# Patient Record
Sex: MALE | Race: WHITE | NOT HISPANIC OR LATINO | URBAN - METROPOLITAN AREA
[De-identification: names, ages, dates, MRNs, and addresses within clinical notes are randomized per-mention and may not be internally consistent; named-entity substitution may affect disease eponyms.]

---

## 2018-04-23 ENCOUNTER — ON CAMPUS - OUTPATIENT (AMBULATORY)
Dept: URBAN - METROPOLITAN AREA HOSPITAL 2 | Facility: HOSPITAL | Age: 53
End: 2018-04-23
Payer: COMMERCIAL

## 2018-04-23 VITALS
DIASTOLIC BLOOD PRESSURE: 51 MMHG | HEART RATE: 71 BPM | SYSTOLIC BLOOD PRESSURE: 133 MMHG | RESPIRATION RATE: 16 BRPM | HEART RATE: 65 BPM | HEART RATE: 76 BPM | DIASTOLIC BLOOD PRESSURE: 94 MMHG | OXYGEN SATURATION: 99 % | HEART RATE: 82 BPM | SYSTOLIC BLOOD PRESSURE: 91 MMHG | SYSTOLIC BLOOD PRESSURE: 103 MMHG | TEMPERATURE: 97.5 F | OXYGEN SATURATION: 100 % | SYSTOLIC BLOOD PRESSURE: 104 MMHG | DIASTOLIC BLOOD PRESSURE: 60 MMHG | DIASTOLIC BLOOD PRESSURE: 70 MMHG | SYSTOLIC BLOOD PRESSURE: 106 MMHG | HEART RATE: 69 BPM | RESPIRATION RATE: 18 BRPM | DIASTOLIC BLOOD PRESSURE: 63 MMHG | HEIGHT: 66 IN | SYSTOLIC BLOOD PRESSURE: 118 MMHG | OXYGEN SATURATION: 97 % | OXYGEN SATURATION: 96 % | HEART RATE: 68 BPM | DIASTOLIC BLOOD PRESSURE: 85 MMHG | RESPIRATION RATE: 14 BRPM | SYSTOLIC BLOOD PRESSURE: 94 MMHG | OXYGEN SATURATION: 98 % | HEART RATE: 64 BPM | WEIGHT: 145 LBS | DIASTOLIC BLOOD PRESSURE: 54 MMHG | SYSTOLIC BLOOD PRESSURE: 116 MMHG

## 2018-04-23 DIAGNOSIS — Z12.11 ENCOUNTER FOR SCREENING FOR MALIGNANT NEOPLASM OF COLON: ICD-10-CM

## 2018-04-23 DIAGNOSIS — K64.1 SECOND DEGREE HEMORRHOIDS: ICD-10-CM

## 2018-04-23 PROCEDURE — 45378 DIAGNOSTIC COLONOSCOPY: CPT | Mod: 33 | Performed by: INTERNAL MEDICINE

## 2018-04-23 RX ADMIN — PROPOFOL: 10 INJECTION, EMULSION INTRAVENOUS at 10:43

## 2019-06-16 ENCOUNTER — HOSPITAL ENCOUNTER (EMERGENCY)
Facility: HOSPITAL | Age: 54
Discharge: HOME | End: 2019-06-16
Attending: EMERGENCY MEDICINE | Admitting: EMERGENCY MEDICINE

## 2019-06-16 VITALS
RESPIRATION RATE: 16 BRPM | SYSTOLIC BLOOD PRESSURE: 134 MMHG | BODY MASS INDEX: 23.46 KG/M2 | TEMPERATURE: 98.2 F | HEART RATE: 70 BPM | HEIGHT: 67 IN | OXYGEN SATURATION: 100 % | WEIGHT: 149.47 LBS | DIASTOLIC BLOOD PRESSURE: 78 MMHG

## 2019-06-16 DIAGNOSIS — R53.1 WEAKNESS: Primary | ICD-10-CM

## 2019-06-16 LAB
ALBUMIN SERPL-MCNC: 4.4 G/DL (ref 3.5–4.8)
ALBUMIN/GLOB SERPL: 1.6 G/DL (ref 1–1.7)
ALP SERPL-CCNC: 53 U/L (ref 32–91)
ALT SERPL W P-5'-P-CCNC: 19 U/L (ref 17–63)
ANION GAP SERPL CALCULATED.3IONS-SCNC: 13 MMOL/L (ref 10–20)
AST SERPL-CCNC: 19 U/L (ref 15–41)
BASOPHILS # BLD AUTO: 0.1 10*3/MM3 (ref 0–0.2)
BASOPHILS NFR BLD AUTO: 1.1 % (ref 0–1.5)
BILIRUB SERPL-MCNC: 0.8 MG/DL (ref 0.3–1.2)
BUN SERPL-MCNC: 11 MG/DL (ref 8–20)
BUN/CREAT SERPL: 12.2 (ref 6.2–20.3)
CALCIUM SPEC-SCNC: 9.6 MG/DL (ref 8.9–10.3)
CHLORIDE SERPL-SCNC: 101 MMOL/L (ref 101–111)
CO2 SERPL-SCNC: 25 MMOL/L (ref 22–32)
CREAT SERPL-MCNC: 0.9 MG/DL (ref 0.7–1.2)
DEPRECATED RDW RBC AUTO: 41.1 FL (ref 37–54)
EOSINOPHIL # BLD AUTO: 0.1 10*3/MM3 (ref 0–0.4)
EOSINOPHIL NFR BLD AUTO: 1 % (ref 0.3–6.2)
ERYTHROCYTE [DISTWIDTH] IN BLOOD BY AUTOMATED COUNT: 12.4 % (ref 12.3–15.4)
ERYTHROCYTE [SEDIMENTATION RATE] IN BLOOD: 3 MM/HR (ref 0–20)
GFR SERPL CREATININE-BSD FRML MDRD: 89 ML/MIN/1.73
GLOBULIN UR ELPH-MCNC: 2.7 GM/DL (ref 2.5–3.8)
GLUCOSE SERPL-MCNC: 96 MG/DL (ref 65–99)
HCT VFR BLD AUTO: 48.3 % (ref 37.5–51)
HGB BLD-MCNC: 16.4 G/DL (ref 13–17.7)
LYMPHOCYTES # BLD AUTO: 1.8 10*3/MM3 (ref 0.7–3.1)
LYMPHOCYTES NFR BLD AUTO: 21.7 % (ref 19.6–45.3)
MAGNESIUM SERPL-MCNC: 2 MG/DL (ref 1.8–2.5)
MCH RBC QN AUTO: 32.3 PG (ref 26.6–33)
MCHC RBC AUTO-ENTMCNC: 34 G/DL (ref 31.5–35.7)
MCV RBC AUTO: 95.1 FL (ref 79–97)
MONOCYTES # BLD AUTO: 0.5 10*3/MM3 (ref 0.1–0.9)
MONOCYTES NFR BLD AUTO: 6.5 % (ref 5–12)
NEUTROPHILS NFR BLD AUTO: 5.9 10*3/MM3 (ref 1.7–7)
NEUTROPHILS NFR BLD AUTO: 69.7 % (ref 42.7–76)
NRBC BLD AUTO-RTO: 0.2 /100 WBC (ref 0–0.2)
PHOSPHATE SERPL-MCNC: 2.9 MG/DL (ref 2.4–4.7)
PLATELET # BLD AUTO: 249 10*3/MM3 (ref 140–450)
PMV BLD AUTO: 8.5 FL (ref 6–12)
POTASSIUM SERPL-SCNC: 3.9 MMOL/L (ref 3.6–5.1)
PROT SERPL-MCNC: 7.1 G/DL (ref 6.1–7.9)
RBC # BLD AUTO: 5.08 10*6/MM3 (ref 4.14–5.8)
SODIUM SERPL-SCNC: 139 MMOL/L (ref 136–144)
TSH SERPL DL<=0.05 MIU/L-ACNC: 0.91 MIU/ML (ref 0.34–5.6)
WBC NRBC COR # BLD: 8.4 10*3/MM3 (ref 3.4–10.8)

## 2019-06-16 PROCEDURE — 84443 ASSAY THYROID STIM HORMONE: CPT | Performed by: EMERGENCY MEDICINE

## 2019-06-16 PROCEDURE — 84100 ASSAY OF PHOSPHORUS: CPT | Performed by: EMERGENCY MEDICINE

## 2019-06-16 PROCEDURE — 85652 RBC SED RATE AUTOMATED: CPT | Performed by: EMERGENCY MEDICINE

## 2019-06-16 PROCEDURE — 83735 ASSAY OF MAGNESIUM: CPT | Performed by: EMERGENCY MEDICINE

## 2019-06-16 PROCEDURE — 99283 EMERGENCY DEPT VISIT LOW MDM: CPT

## 2019-06-16 PROCEDURE — 80053 COMPREHEN METABOLIC PANEL: CPT | Performed by: EMERGENCY MEDICINE

## 2019-06-16 PROCEDURE — 85025 COMPLETE CBC W/AUTO DIFF WBC: CPT | Performed by: EMERGENCY MEDICINE

## 2019-06-16 RX ORDER — SODIUM CHLORIDE 0.9 % (FLUSH) 0.9 %
10 SYRINGE (ML) INJECTION AS NEEDED
Status: DISCONTINUED | OUTPATIENT
Start: 2019-06-16 | End: 2019-06-16 | Stop reason: HOSPADM

## 2019-06-16 RX ADMIN — SODIUM CHLORIDE, SODIUM LACTATE, POTASSIUM CHLORIDE, AND CALCIUM CHLORIDE 1000 ML: 600; 310; 30; 20 INJECTION, SOLUTION INTRAVENOUS at 17:09

## 2019-06-16 RX ADMIN — Medication 10 ML: at 17:11

## 2019-06-19 ENCOUNTER — OFFICE VISIT (OUTPATIENT)
Dept: FAMILY MEDICINE CLINIC | Facility: CLINIC | Age: 54
End: 2019-06-19

## 2019-06-19 VITALS
WEIGHT: 148 LBS | HEART RATE: 67 BPM | DIASTOLIC BLOOD PRESSURE: 84 MMHG | SYSTOLIC BLOOD PRESSURE: 130 MMHG | BODY MASS INDEX: 23.23 KG/M2 | HEIGHT: 67 IN | TEMPERATURE: 98.9 F | OXYGEN SATURATION: 97 %

## 2019-06-19 DIAGNOSIS — M62.81 MUSCLE WEAKNESS (GENERALIZED): ICD-10-CM

## 2019-06-19 DIAGNOSIS — R63.0 ANOREXIA: ICD-10-CM

## 2019-06-19 DIAGNOSIS — R29.2 HYPERREFLEXIA: ICD-10-CM

## 2019-06-19 DIAGNOSIS — IMO0001 PARESTHESIAS/NUMBNESS: ICD-10-CM

## 2019-06-19 DIAGNOSIS — K21.9 GASTROESOPHAGEAL REFLUX DISEASE, ESOPHAGITIS PRESENCE NOT SPECIFIED: Primary | ICD-10-CM

## 2019-06-19 PROCEDURE — 99203 OFFICE O/P NEW LOW 30 MIN: CPT | Performed by: FAMILY MEDICINE

## 2019-06-19 RX ORDER — PANTOPRAZOLE SODIUM 40 MG/1
1 TABLET, DELAYED RELEASE ORAL DAILY
Refills: 5 | COMMUNITY
Start: 2019-06-12

## 2019-06-19 NOTE — PROGRESS NOTES
Subjective   Yvan Lui is a 53 y.o. male.   Chief Complaint   Patient presents with   • Extremity Weakness     Mostly Arms but legs also   • Anorexia     Loss of Appetitie - lead to weight loss     Yvan presents to the office today as a new patient.  He is 53 years old and typically has been in good health.  He had an episode just over a week ago where he experienced sudden tingling all over his body and felt very weak.  This resolved and got better on its own.  He had a second episode the next day which there is no hospital ER.  He was checked for Lyme disease which reportedly was negative.  He has CT scan of his brain also that was negative.  The tingling sensation over his body and nearly resolved by that point.  Associated symptoms during this time includes anorexia.  He essentially has had no appetite for about a week.  He states he has begun losing a few pounds.  He will have periods of extreme weakness, and during these episodes he has to force himself to eat.  Because of the ongoing severe weakness which was disabling, preventing him from going to work for 2 days.  He went to the [facility] ER.  He had lab work done again and it was all negative.  He was advised to see a neurologist and discharged home.  He describes just feeling so tired that he can go on.  He is not confused he is not delirious he is not diaphoretic he does not have any chest pain associated with this no loss of strength in his arms or legs.  No loss of bowel or bladder control.  He is still able to speak during these events.  He does not go back to sleep.  He will simply lay on the couch.  His only medication is Protonix.  He is taken no new medications recently.  He has had no new environmental or occupational exposures.  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  No known history of neurologic problems nor elevated blood pressure.       Patient Active Problem List   Diagnosis   • GERD (gastroesophageal  "reflux disease)     Past Medical History:   Diagnosis Date   • Rapid heart beat      Past Surgical History:   Procedure Laterality Date   • EYE SURGERY      L Eye - Injury playing softball       Current Outpatient Medications:   •  pantoprazole (PROTONIX) 40 MG EC tablet, Take 1 tablet by mouth Daily., Disp: , Rfl: 5  Allergies   Allergen Reactions   • Codeine Palpitations     Social History     Socioeconomic History   • Marital status: Unknown     Spouse name: Not on file   • Number of children: Not on file   • Years of education: Not on file   • Highest education level: Not on file   Tobacco Use   • Smoking status: Never Smoker   • Smokeless tobacco: Never Used   Substance and Sexual Activity   • Alcohol use: Yes     Frequency: Never     Comment: Social   • Drug use: No   • Sexual activity: Yes     Partners: Female     Comment: Wife     Family History   Problem Relation Age of Onset   • No Known Problems Mother    • No Known Problems Father    • Heart disease Sister    • Leukemia Maternal Grandfather      The following portions of the patient's history were reviewed and updated as appropriate: allergies, current medications, past family history, past medical history, past social history, past surgical history and problem list.    Review of Systems    Objective   /84   Pulse 67   Temp 98.9 °F (37.2 °C) (Oral)   Ht 170.2 cm (67\")   Wt 67.1 kg (148 lb)   SpO2 97%   BMI 23.18 kg/m²   Physical Exam   Constitutional: He is oriented to person, place, and time. He appears well-developed and well-nourished.   HENT:   Head: Normocephalic and atraumatic.   Mouth/Throat: Oropharynx is clear and moist.   Eyes: Conjunctivae and EOM are normal. Pupils are equal, round, and reactive to light.   Neck: Neck supple. No JVD present. No thyromegaly present.   Cardiovascular: Normal rate, regular rhythm, normal heart sounds and intact distal pulses.   No murmur heard.  Pulmonary/Chest: Effort normal and breath sounds " normal. No respiratory distress. He has no wheezes. He has no rales. He exhibits no tenderness.   Abdominal: Soft. He exhibits no distension and no mass. There is no tenderness. There is no rebound and no guarding.   Musculoskeletal: Normal range of motion. He exhibits no edema.   Lymphadenopathy:     He has no cervical adenopathy.   Neurological: He is alert and oriented to person, place, and time. He has normal strength. He displays no atrophy and no tremor. No cranial nerve deficit or sensory deficit. He exhibits normal muscle tone (maybe the faintest hint of cogwheeling). GCS eye subscore is 4. GCS verbal subscore is 5. GCS motor subscore is 6.   Reflex Scores:       Bicep reflexes are 1+ on the right side and 1+ on the left side.       Patellar reflexes are 3+ on the right side and 3+ on the left side.       Achilles reflexes are 1+ on the right side and 1+ on the left side.  Skin: Skin is warm. No rash noted.   Psychiatric: He has a normal mood and affect. His behavior is normal.         Assessment/Plan   Diagnoses and all orders for this visit:    1. Gastroesophageal reflux disease, esophagitis presence not specified (Primary)  -     CT Abdomen Pelvis With Contrast; Future    2. Anorexia  -     CT Abdomen Pelvis With Contrast; Future  -     XR Chest 2 View; Future  -     Ambulatory Referral to Neurology    3. Paresthesias/numbness  -     MRI Brain With & Without Contrast; Future  -     XR Chest 2 View; Future  -     Ambulatory Referral to Neurology    4. Muscle weakness (generalized)  -     MRI Brain With & Without Contrast; Future  -     XR Spine Cervical Complete 4 or 5 View; Future  -     XR Spine Lumbar 4+ View; Future  -     XR Chest 2 View; Future  -     Ambulatory Referral to Neurology    5. Hyperreflexia  -     MRI Brain With & Without Contrast; Future  -     XR Spine Cervical Complete 4 or 5 View; Future  -     XR Spine Lumbar 4+ View; Future  -     Ambulatory Referral to Neurology    Unusual  presentation.  Will complete work-up as above.  Differential diagnosis is quite broad.  Includes paraneoplastic syndromes, central neurologic problems, somatic problems, possibly compressive neuropathies.  Work-up and refer to neurology as soon as this can be arranged.  Call with any change in symptoms or worsening.  Follow-up as planned below.             Return in about 1 week (around 6/26/2019).

## 2019-06-26 ENCOUNTER — TELEPHONE (OUTPATIENT)
Dept: FAMILY MEDICINE CLINIC | Facility: CLINIC | Age: 54
End: 2019-06-26

## 2019-06-26 ENCOUNTER — TRANSCRIBE ORDERS (OUTPATIENT)
Dept: ADMINISTRATIVE | Facility: HOSPITAL | Age: 54
End: 2019-06-26

## 2019-06-26 DIAGNOSIS — R63.0 ANOREXIA: Primary | ICD-10-CM

## 2019-06-26 DIAGNOSIS — M62.81 MUSCLE WEAKNESS (GENERALIZED): ICD-10-CM

## 2019-06-26 DIAGNOSIS — F41.1 ANXIOUS REACTION: Primary | ICD-10-CM

## 2019-06-26 RX ORDER — ALPRAZOLAM 0.25 MG/1
0.25 TABLET ORAL ONCE
Qty: 1 TABLET | Refills: 0 | Status: SHIPPED | OUTPATIENT
Start: 2019-06-26 | End: 2019-06-26

## 2019-06-26 NOTE — TELEPHONE ENCOUNTER
Pt calling wanting something to relax him for his upcoming MRI. He is claustrophobic. Scheduled Monday. Pt uses [email or web address] [place]

## 2019-07-01 ENCOUNTER — HOSPITAL ENCOUNTER (OUTPATIENT)
Dept: CT IMAGING | Facility: HOSPITAL | Age: 54
Discharge: HOME | End: 2019-07-01
Admitting: FAMILY MEDICINE

## 2019-07-01 ENCOUNTER — HOSPITAL ENCOUNTER (OUTPATIENT)
Dept: MRI IMAGING | Facility: HOSPITAL | Age: 54
Discharge: HOME | End: 2019-07-01

## 2019-07-01 DIAGNOSIS — R63.0 ANOREXIA: ICD-10-CM

## 2019-07-01 DIAGNOSIS — M62.81 MUSCLE WEAKNESS (GENERALIZED): ICD-10-CM

## 2019-07-01 DIAGNOSIS — R29.2 HYPERREFLEXIA: ICD-10-CM

## 2019-07-01 DIAGNOSIS — IMO0001 PARESTHESIAS/NUMBNESS: ICD-10-CM

## 2019-07-01 DIAGNOSIS — K21.9 GASTROESOPHAGEAL REFLUX DISEASE, ESOPHAGITIS PRESENCE NOT SPECIFIED: ICD-10-CM

## 2019-07-01 PROCEDURE — 74177 CT ABD & PELVIS W/CONTRAST: CPT

## 2019-07-01 PROCEDURE — 25010000002 GADOTERIDOL PER 1 ML: Performed by: FAMILY MEDICINE

## 2019-07-01 PROCEDURE — 0 IOPAMIDOL PER 1 ML: Performed by: FAMILY MEDICINE

## 2019-07-01 PROCEDURE — 70553 MRI BRAIN STEM W/O & W/DYE: CPT

## 2019-07-01 PROCEDURE — A9576 INJ PROHANCE MULTIPACK: HCPCS | Performed by: FAMILY MEDICINE

## 2019-07-01 RX ADMIN — IOPAMIDOL 100 ML: 755 INJECTION, SOLUTION INTRAVENOUS at 11:45

## 2019-07-01 RX ADMIN — GADOTERIDOL 13 ML: 279.3 INJECTION, SOLUTION INTRAVENOUS at 11:15

## 2019-07-03 ENCOUNTER — TELEPHONE (OUTPATIENT)
Dept: FAMILY MEDICINE CLINIC | Facility: CLINIC | Age: 54
End: 2019-07-03

## 2019-07-03 NOTE — TELEPHONE ENCOUNTER
Patient called back into office. Advised him of test results. Does not have appt with Neuro yet. Advised him if he has a a particular Neuro that he would like to see, then to let us know. Wants to know what other test results you are awaiting and if you still want him to see Neuro even with negative testing results. He made an appt next week with you as well to follow up.

## 2019-07-05 NOTE — TELEPHONE ENCOUNTER
Please tell Yvan that yes, I still do want him to see a neurologist.  The tests have been helpful that they have eliminated some possible causes.  They have not, however, led us to the cause of his spells.  That's why the neurology evaluation will be helpful.    Thanks

## 2019-07-10 ENCOUNTER — OFFICE VISIT (OUTPATIENT)
Dept: FAMILY MEDICINE CLINIC | Facility: CLINIC | Age: 54
End: 2019-07-10

## 2019-07-10 VITALS
TEMPERATURE: 98.3 F | SYSTOLIC BLOOD PRESSURE: 118 MMHG | DIASTOLIC BLOOD PRESSURE: 78 MMHG | BODY MASS INDEX: 24.48 KG/M2 | OXYGEN SATURATION: 97 % | HEART RATE: 75 BPM | HEIGHT: 67 IN | WEIGHT: 156 LBS | RESPIRATION RATE: 18 BRPM

## 2019-07-10 DIAGNOSIS — IMO0001 PARESTHESIAS/NUMBNESS: Primary | ICD-10-CM

## 2019-07-10 DIAGNOSIS — M47.22 CERVICAL SPONDYLOSIS WITH RADICULOPATHY: ICD-10-CM

## 2019-07-10 PROCEDURE — 99213 OFFICE O/P EST LOW 20 MIN: CPT | Performed by: FAMILY MEDICINE

## 2019-07-10 NOTE — PROGRESS NOTES
Subjective   Yvan Lui is a 53 y.o. male.   Chief Complaint   Patient presents with   • Follow-up     testing/imaging         History of Present Illness   Patient presents today for a follow up on new problem described at recent office visit.  Please see previous note for details.  CT scan of the abdomen was normal.  MRI of the brain is normal for age.  I reviewed these with Yvan.  X-rays I ordered not done inadvertently, because hospital can find the order.  It was in the computer, though.  Since last visit, he has had no more of the major tingling episodes.  He will experience mild transient weakness in his bilateral arms.  This is not a daily occurrence, and if it does occur it is usually in the middle of night or early in the morning.  he typically gets back to feeling complete normal in the afternoon / evening.  His appetite has largely returned.  He does not have much of an appetite for breakfast, but after lunch he eats like normal.  He has gained 6 or 7 pounds since his last visit.  The only other symptom is some occasional ringing in his ears.  He has not seen a neurologist or made an appointment.    Patient Active Problem List    Diagnosis Date Noted   • GERD (gastroesophageal reflux disease) 06/19/2019           Past Surgical History:   Procedure Laterality Date   • EYE SURGERY      L Eye - Injury playing softball       Current Outpatient Medications:   •  pantoprazole (PROTONIX) 40 MG EC tablet, Take 1 tablet by mouth Daily., Disp: , Rfl: 5  Allergies   Allergen Reactions   • Codeine Palpitations   • Codeine Rash     Heart racing     Social History     Socioeconomic History   • Marital status:      Spouse name: Not on file   • Number of children: Not on file   • Years of education: Not on file   • Highest education level: Not on file   Tobacco Use   • Smoking status: Never Smoker   • Smokeless tobacco: Never Used   Substance and Sexual Activity   • Alcohol use: Yes     Frequency: Never     " Comment: Social   • Drug use: No   • Sexual activity: Yes     Partners: Female     Comment: Wife     Family History   Problem Relation Age of Onset   • No Known Problems Mother    • No Known Problems Father    • Heart disease Sister    • Leukemia Maternal Grandfather      The following portions of the patient's history were reviewed and updated as appropriate: allergies, current medications, past social history and problem list.    Review of Systems   Constitutional: Positive for fatigue (has not been running for over a month).   Eyes: Negative for visual disturbance.   Respiratory: Negative for cough, choking, chest tightness and shortness of breath.    Cardiovascular: Negative for chest pain and leg swelling.   Neurological: Positive for numbness (occasional numbness in bilateral clves for a few seconds). Negative for dizziness, seizures, light-headedness, headache and confusion.   Psychiatric/Behavioral: Negative for dysphoric mood, hallucinations, negative for hyperactivity and depressed mood.       Objective   /78 (BP Location: Left arm, Patient Position: Sitting)   Pulse 75   Temp 98.3 °F (36.8 °C) (Oral)   Resp 18   Ht 170.2 cm (67\")   Wt 70.8 kg (156 lb)   SpO2 97%   BMI 24.43 kg/m²   Physical Exam   Constitutional: He is oriented to person, place, and time. He appears well-developed and well-nourished.   HENT:   Head: Normocephalic and atraumatic.   Right Ear: Hearing, tympanic membrane, external ear and ear canal normal.   Left Ear: Hearing, tympanic membrane, external ear and ear canal normal.   Eyes: Conjunctivae, EOM and lids are normal. Pupils are equal, round, and reactive to light. Pupils are equal.   Cardiovascular: Normal rate.   Pulmonary/Chest: Effort normal.   Musculoskeletal: Normal range of motion. He exhibits no edema.   Neurological: He is alert and oriented to person, place, and time. No sensory deficit. He exhibits normal muscle tone. Coordination normal.         Admission on " 06/16/2019, Discharged on 06/16/2019   Component Date Value Ref Range Status   • Glucose 06/16/2019 96  65 - 99 mg/dL Final   • BUN 06/16/2019 11  8 - 20 mg/dL Final   • Creatinine 06/16/2019 0.90  0.70 - 1.20 mg/dL Final   • Sodium 06/16/2019 139  136 - 144 mmol/L Final   • Potassium 06/16/2019 3.9  3.6 - 5.1 mmol/L Final   • Chloride 06/16/2019 101  101 - 111 mmol/L Final   • CO2 06/16/2019 25.0  22.0 - 32.0 mmol/L Final   • Calcium 06/16/2019 9.6  8.9 - 10.3 mg/dL Final   • Total Protein 06/16/2019 7.1  6.1 - 7.9 g/dL Final   • Albumin 06/16/2019 4.40  3.50 - 4.80 g/dL Final   • ALT (SGPT) 06/16/2019 19  17 - 63 U/L Final   • AST (SGOT) 06/16/2019 19  15 - 41 U/L Final   • Alkaline Phosphatase 06/16/2019 53  32 - 91 U/L Final   • Total Bilirubin 06/16/2019 0.8  0.3 - 1.2 mg/dL Final   • eGFR Non African Amer 06/16/2019 89  >60 mL/min/1.73 Final   • Globulin 06/16/2019 2.7  2.5 - 3.8 gm/dL Final   • A/G Ratio 06/16/2019 1.6  1.0 - 1.7 g/dL Final   • BUN/Creatinine Ratio 06/16/2019 12.2  6.2 - 20.3 Final   • Anion Gap 06/16/2019 13.0  10.0 - 20.0 mmol/L Final   • Magnesium 06/16/2019 2.0  1.8 - 2.5 mg/dL Final   • Phosphorus 06/16/2019 2.9  2.4 - 4.7 mg/dL Final   • TSH 06/16/2019 0.910  0.340 - 5.600 mIU/mL Final    Results may be falsely decreased if patient taking Biotin.   • Sed Rate 06/16/2019 3  0 - 20 mm/hr Final   • WBC 06/16/2019 8.40  3.40 - 10.80 10*3/mm3 Final   • RBC 06/16/2019 5.08  4.14 - 5.80 10*6/mm3 Final   • Hemoglobin 06/16/2019 16.4  13.0 - 17.7 g/dL Final   • Hematocrit 06/16/2019 48.3  37.5 - 51.0 % Final   • MCV 06/16/2019 95.1  79.0 - 97.0 fL Final   • MCH 06/16/2019 32.3  26.6 - 33.0 pg Final   • MCHC 06/16/2019 34.0  31.5 - 35.7 g/dL Final   • RDW 06/16/2019 12.4  12.3 - 15.4 % Final   • RDW-SD 06/16/2019 41.1  37.0 - 54.0 fl Final   • MPV 06/16/2019 8.5  6.0 - 12.0 fL Final   • Platelets 06/16/2019 249  140 - 450 10*3/mm3 Final   • Neutrophil % 06/16/2019 69.7  42.7 - 76.0 % Final   •  Lymphocyte % 06/16/2019 21.7  19.6 - 45.3 % Final   • Monocyte % 06/16/2019 6.5  5.0 - 12.0 % Final   • Eosinophil % 06/16/2019 1.0  0.3 - 6.2 % Final   • Basophil % 06/16/2019 1.1  0.0 - 1.5 % Final   • Neutrophils, Absolute 06/16/2019 5.90  1.70 - 7.00 10*3/mm3 Final   • Lymphocytes, Absolute 06/16/2019 1.80  0.70 - 3.10 10*3/mm3 Final   • Monocytes, Absolute 06/16/2019 0.50  0.10 - 0.90 10*3/mm3 Final   • Eosinophils, Absolute 06/16/2019 0.10  0.00 - 0.40 10*3/mm3 Final   • Basophils, Absolute 06/16/2019 0.10  0.00 - 0.20 10*3/mm3 Final   • nRBC 06/16/2019 0.2  0.0 - 0.2 /100 WBC Final         Assessment/Plan   Diagnoses and all orders for this visit:    1. Paresthesias/numbness (Primary)  -     XR Spine Lumbar 4+ View (In Office)  -     XR Spine Cervical Complete 4 or 5 View (In Office)    As he continues to have some rare, albeit persistent, extremity symptoms that seem to be worse in the middle of the night or early morning, I question whether they could be radicular in origin.  We will go ahead and reorder the x-rays of his lumbar and cervical spine.  Overall, his condition is improving.  I briefly asked him to consider whether he would want to pursue additional testing if the x-rays are abnormal.  He will think about this and let me know.  Follow-up with him after the results of the x-rays are available.             No Follow-up on file.

## 2019-08-19 ENCOUNTER — PROCEDURE VISIT (OUTPATIENT)
Dept: NEUROLOGY | Facility: CLINIC | Age: 54
End: 2019-08-19

## 2019-08-19 DIAGNOSIS — G56.03 BILATERAL CARPAL TUNNEL SYNDROME: Primary | ICD-10-CM

## 2019-08-19 PROCEDURE — 95910 NRV CNDJ TEST 7-8 STUDIES: CPT | Performed by: PSYCHIATRY & NEUROLOGY

## 2019-08-19 PROCEDURE — 95886 MUSC TEST DONE W/N TEST COMP: CPT | Performed by: PSYCHIATRY & NEUROLOGY

## 2019-08-19 NOTE — PROGRESS NOTES
EMG and Nerve Conduction Studies    The complete report includes the data sheets.     Referring Doctor: Maria Antonia López MD    Indication: BUE for Paresthesia/Numbness    History:  extremity symptoms that seem to be worse in the middle of the night or early morning               Are you a diabetic? noHepatitis?noHIV?noPacemaker?noDefibrillator?noBlood thinner?noAre you allergic to latex or tape?yes    Results:    1. The Median sensory distal latencies were slightly prolonged.  The median palmar latencies were slightly prolonged.  The median motor NCS's were normal.     2.  The EMG of the muscles tested in the upper extremities in the  C5-T1 myotomes were normal.    Impression:    This is an abnormal study.  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     Joseph Seipel, M.D.